# Patient Record
Sex: FEMALE | Race: BLACK OR AFRICAN AMERICAN | Employment: FULL TIME | URBAN - METROPOLITAN AREA
[De-identification: names, ages, dates, MRNs, and addresses within clinical notes are randomized per-mention and may not be internally consistent; named-entity substitution may affect disease eponyms.]

---

## 2019-10-30 ENCOUNTER — OFFICE VISIT (OUTPATIENT)
Dept: NEUROLOGY | Age: 59
End: 2019-10-30

## 2019-10-30 VITALS
DIASTOLIC BLOOD PRESSURE: 81 MMHG | RESPIRATION RATE: 16 BRPM | WEIGHT: 116.8 LBS | HEART RATE: 60 BPM | HEIGHT: 61 IN | OXYGEN SATURATION: 98 % | SYSTOLIC BLOOD PRESSURE: 138 MMHG | BODY MASS INDEX: 22.05 KG/M2 | TEMPERATURE: 98.6 F

## 2019-10-30 DIAGNOSIS — G43.019 INTRACTABLE MIGRAINE WITHOUT AURA AND WITHOUT STATUS MIGRAINOSUS: ICD-10-CM

## 2019-10-30 DIAGNOSIS — G56.03 BILATERAL CARPAL TUNNEL SYNDROME: ICD-10-CM

## 2019-10-30 DIAGNOSIS — G44.211 INTRACTABLE EPISODIC TENSION-TYPE HEADACHE: ICD-10-CM

## 2019-10-30 DIAGNOSIS — R20.2 PARESTHESIA: ICD-10-CM

## 2019-10-30 DIAGNOSIS — R56.9 SEIZURE (HCC): Primary | ICD-10-CM

## 2019-10-30 DIAGNOSIS — R20.9 CVA, OLD, ALTERATIONS OF SENSATIONS: ICD-10-CM

## 2019-10-30 DIAGNOSIS — I69.398 CVA, OLD, ALTERATIONS OF SENSATIONS: ICD-10-CM

## 2019-10-30 RX ORDER — GUAIFENESIN 100 MG/5ML
81 LIQUID (ML) ORAL DAILY
COMMUNITY

## 2019-10-30 RX ORDER — AMITRIPTYLINE HYDROCHLORIDE 10 MG/1
10 TABLET, FILM COATED ORAL
Qty: 30 TAB | Refills: 1 | Status: SHIPPED | OUTPATIENT
Start: 2019-10-30

## 2019-10-30 RX ORDER — IBUPROFEN 800 MG/1
800 TABLET ORAL
Qty: 30 TAB | Refills: 0 | Status: SHIPPED | OUTPATIENT
Start: 2019-10-30

## 2019-10-30 RX ORDER — LAMOTRIGINE 25 MG/1
25 TABLET ORAL
Qty: 30 TAB | Refills: 1 | Status: SHIPPED | OUTPATIENT
Start: 2019-10-30 | End: 2019-12-11 | Stop reason: SDUPTHER

## 2019-10-30 NOTE — PATIENT INSTRUCTIONS
All test results will be discussed at the next appointment. MRI of the Head: About This Test  What is it? MRI (magnetic resonance imaging) is a test that uses a magnetic field and pulses of radio wave energy to make pictures of the organs and structures inside the body. An MRI of the head can give your doctor information about your brain, eyes, ears, and nerves. When you have an MRI, you lie on a table and the table moves into the MRI machine. Why is this test done? An MRI of the head can help find problems such as tumors and infection. It also can check symptoms of a head injury or of diseases such as multiple sclerosis (MS) and stroke. How can you prepare for the test?  Talk to your doctor about all your health conditions before the test. For example, tell your doctor if:  · You are allergic to any medicines. · You are or might be pregnant. · You have a pacemaker, an artificial limb, any metal pins or metal parts in your body, metal heart valves, metal clips in your brain, metal implants in your ears, or any other implanted or prosthetic medical device. · You have an intrauterine device (IUD) in place. · You get nervous in confined spaces. You may need medicine to help you relax. · You wear a patch that contains medicine. · You have kidney disease. What happens before the test?  · You will remove all metal objects, such as hearing aids, dentures, jewelry, watches, and hairpins. · You may need to take off some of your clothes. You will be given a gown to wear during the test. If you do leave some clothes on, make sure you take everything out of your pockets. · You may have contrast material (dye) put into your arm through a tube called an IV. Contrast material helps doctors see specific organs, blood vessels, and most tumors. What happens during the test?  · You will lie on your back on a table that is part of the MRI scanner.  Your head, chest, and arms may be held with straps to help you lie still.  · The table will slide into the space that contains the magnet. A device called a coil may be placed over or wrapped around your head. · Inside the scanner you will hear a fan and feel air moving. You may hear tapping, thumping, or snapping noises. You may be given earplugs or headphones to reduce the noise. · You will be asked to hold still during the scan. You may be asked to hold your breath for short periods. · You may be alone in the scanning room, but a technologist will be watching you through a window and talking with you during the test.  What else should you know about the test?  · An MRI does not hurt. · You may feel warmth in the area being examined. This is normal.  · A dye (contrast material) that contains gadolinium may be used in this test. Be sure to tell your doctor if:  ? You are pregnant or think you may be pregnant. ? You have kidney problems. ? You've had more than one test that used gadolinium. · The U.S. Food and Drug Administration (FDA) has safety warnings about gadolinium. But for most people, the benefit of its use in this test outweighs the risk. · If a dye is used, you may feel a quick sting or pinch and some coolness when the IV is started. The dye may give you a metallic taste in your mouth. Some people feel sick to their stomach or get a headache. · If you breastfeed and are concerned about whether the dye used in this test is safe, talk to your doctor. Most experts believe that very little dye passes into breast milk and even less is passed on to the baby. But if you prefer, you can store some of your breast milk ahead of time and use it for a day or two after the test.  How long does the test take? · The test usually takes 30 to 60 minutes but can take as long as 2 hours. What happens after the test?  · You will probably be able to go home right away, depending on the reason for the test.  · You can go back to your usual activities right away.   Follow-up care is a key part of your treatment and safety. Be sure to make and go to all appointments, and call your doctor if you are having problems. It's also a good idea to keep a list of the medicines you take. Ask your doctor when you can expect to have your test results. Where can you learn more? Go to http://irvin-ashlie.info/. Enter X424 in the search box to learn more about \"MRI of the Head: About This Test.\"  Current as of: March 28, 2019  Content Version: 12.2  © 7867-6529 [employer]. Care instructions adapted under license by [employer] (which disclaims liability or warranty for this information). If you have questions about a medical condition or this instruction, always ask your healthcare professional. [address] any warranty or liability for your use of this information. Electroencephalogram (EEG): About This Test  What is it? An electroencephalogram (EEG) lets a doctor see the electrical activity of your brain. You will have small pads or patches attached to different places on your head. These are called electrodes. Wires connect the electrodes to a computer. The computer records the activity of the brain. This looks like wavy lines on the computer screen or on paper. Why is this test done? The test is often used to diagnose epilepsy. It helps a doctor know what types of seizures are happening. An EEG can also check brain activity in people with sleep disorders. It can also help a doctor know why a person passed out (lost consciousness). How can you prepare for the test?  · Tell your doctor if you are taking any medicines. Your doctor may ask you to stop taking certain medicines before the test. These include sedatives and tranquilizers, muscle relaxants, sleeping aids, and seizure medicines.   · Do not eat or drink anything with caffeine in it for 12 hours before the test. This includes cola, energy drinks, and chocolate. · Shampoo your hair and rinse with clear water the evening before or the morning of the test. Do not put any hair conditioner or oil on after you wash your hair. · Your doctor may ask you not to sleep the night before the test or to sleep for only about 4 or 5 hours. This is because some types of brain activity can only be seen if you are asleep. If your doctor asks you to get less sleep than normal, plan to have someone drive you to and from the test.  What happens during the test?  · You will lie on your back on a bed or table. Or you might relax in a chair with your eyes closed. · A technologist will attach the electrodes to different places on your head. Or you might get a cap with fixed electrodes on it. · You will lie still with your eyes closed. The technologist will tell you not to talk unless you need to. · The technologist may ask you to:  ? Breathe deeply and rapidly. This is called hyperventilating. ? Look at a bright, flashing light called a strobe. ? Go to sleep. If you can't fall asleep, you may get medicine to help you. What else should you know about the test?  · There is no pain. No electrical current goes through your body. · If you have a seizure disorder such as epilepsy, the flashing lights or hyperventilation may cause a seizure. The technologist is trained to take care of you if this happens. · If electrodes are put in your nose, they may tickle. In rare cases, they can also cause some soreness or a small amount of bleeding for 1 to 2 days after the test.  How long does the test take? · The test will take about 1 to 2 hours. What happens after the test?  · You will probably be able to go home right away. But if you didn't sleep your normal amount before the test, have someone drive you home. · You can go back to your usual activities right away. When should you call for help?   Watch closely for changes in your health, and be sure to contact your doctor if:  · You have any problems that you think may be from the test.  · You have any questions about the test or have not received your results. Follow-up care is a key part of your treatment and safety. Be sure to make and go to all appointments, and call your doctor if you are having problems. It's also a good idea to keep a list of the medicines you take. Ask your doctor when you can expect to have your test results. Where can you learn more? Go to http://irvin-ashlie.info/. Enter F196 in the search box to learn more about \"Electroencephalogram (EEG): About This Test.\"  Current as of: March 28, 2019  Content Version: 12.2  © 7834-3591 [employer]. Care instructions adapted under license by [employer] (which disclaims liability or warranty for this information). If you have questions about a medical condition or this instruction, always ask your healthcare professional. [place] [address] any warranty or liability for your use of this information. Electromyogram (EMG) and Nerve Conduction Studies: About These Tests  What are they? An electromyogram (EMG) measures the electrical activity of your muscles when you are not using them (at rest) and when you tighten them (muscle contraction). Nerve conduction studies (NCS) measure how well and how fast the nerves can send electrical signals. EMG and nerve conduction studies are often done together. If they are done together, the nerve conduction studies are done before the EMG. Why are they done? You may need an EMG to find diseases that damage your muscles or nerves or to find why you cannot move your muscles (paralysis), why they feel weak, or why they twitch. You may need nerve conduction studies to find damage to the nerves that lead from the brain and spinal cord to the rest of the body (peripheral nervous system).  Nerve conduction studies are often used to help find nerve disorders, such as carpal tunnel syndrome. How can you prepare for these tests? · Tell your doctors ALL the medicines, vitamins, supplements, and herbal remedies you take. Some medicines can affect the test results. You may need to stop taking some medicines before you have this test.     · If you take aspirin or some other blood thinner, be sure to talk to your doctor. He or she will tell you if you should stop taking it before your test. Make sure that you understand exactly what your doctor wants you to do.     · Wear loose-fitting clothing. You may be given a hospital gown to wear.     · The electrodes for the test are attached to your skin. Your skin needs to be clean and free of sprays, oils, creams, and lotions. What happens during the tests? You lie on a table or bed or sit in a reclining chair so your muscles are relaxed. For an EMG:  · Your doctor will insert a needle electrode into a muscle. This will record the electrical activity while the muscle is at rest. You may feel a quick, sharp pain when the needle electrode is put into a muscle. · Your doctor will ask you to tighten the same muscle slowly and steadily while the electrical activity is recorded. · Your doctor may move the electrode to a different area of the muscle or a different muscle. For nerve conduction studies:  · Your doctor will attach two types of electrodes to your skin. ? One type of electrode is placed over a nerve and will give the nerve an electrical pulse. ? The other type of electrode is placed over the muscle that the nerve controls. It will record how long it takes the muscle to react to the electrical pulse. · You will be able to feel the electrical pulses. They are small shocks and are safe. What else should you know about these tests? · After an EMG, you may be sore and have a tingling feeling in your muscles for up to 2 days. You may have small bruises or swelling at the needle site. · For an EMG, you may be asked to sign a consent form. Talk to your doctor about any concerns you have about the need for the test, its risks, how it will be done, or what the results will mean. How long do they take? · An EMG may take 30 to 60 minutes. · Nerve conduction tests may take from 15 minutes to 1 hour or more. It depends on how many nerves and muscles your doctor tests. What happens after these tests? · If any of the test areas are sore:  ? Put ice or a cold pack on the area for 10 to 20 minutes at a time. Put a thin cloth between the ice and your skin. ? Take an over-the-counter pain medicine, such as acetaminophen (Tylenol), ibuprofen (Advil, Motrin), or naproxen (Aleve). Be safe with medicines. Read and follow all instructions on the label. · You will probably be able to go home right away. · You can go back to your usual activities right away. When should you call for help? Watch closely for changes in your health, and be sure to contact your doctor if:  · Muscle pain from an EMG test gets worse or you have swelling, tenderness, or pus at any of the needle sites. · You have any problems that you think may be from the test.  · You have any questions about the test or have not received your results. Follow-up care is a key part of your treatment and safety. Be sure to make and go to all appointments, and call your doctor if you are having problems. It's also a good idea to keep a list of the medicines you take. Ask your doctor when you can expect to have your test results. Where can you learn more? Go to http://irvin-ashlie.info/. Enter N690 in the search box to learn more about \"Electromyogram (EMG) and Nerve Conduction Studies: About These Tests. \"  Current as of: March 28, 2019  Content Version: 12.2  © 5526-5692 [employer]. Care instructions adapted under license by [employer] (which disclaims liability or warranty for this information).  If you have questions about a medical condition or this instruction, always ask your healthcare professional. [place] [address] any warranty or liability for your use of this information.

## 2019-11-05 LAB
CRP SERPL HS-MCNC: 0.54 MG/L (ref 0–3)
HCYS SERPL-SCNC: 9.9 UMOL/L (ref 0–15)

## 2019-11-06 LAB
25(OH)D3+25(OH)D2 SERPL-MCNC: 24.7 NG/ML (ref 30–100)
ACE SERPL-CCNC: 55 U/L (ref 14–82)
ALDOLASE SERPL-CCNC: 4.1 U/L (ref 3.3–10.3)
ANA SER QL: NEGATIVE
CK SERPL-CCNC: 190 U/L (ref 24–173)
ERYTHROCYTE [SEDIMENTATION RATE] IN BLOOD BY WESTERGREN METHOD: 2 MM/HR (ref 0–40)
PROLACTIN SERPL-MCNC: 6 NG/ML (ref 4.8–23.3)
RHEUMATOID FACT SERPL-ACNC: <10 IU/ML (ref 0–13.9)
VIT B12 SERPL-MCNC: 465 PG/ML (ref 232–1245)

## 2019-11-20 ENCOUNTER — HOSPITAL ENCOUNTER (OUTPATIENT)
Dept: MRI IMAGING | Age: 59
Discharge: HOME | End: 2019-11-20
Attending: PSYCHIATRY & NEUROLOGY
Payer: COMMERCIAL

## 2019-11-20 ENCOUNTER — HOSPITAL ENCOUNTER (OUTPATIENT)
Dept: NEUROLOGY | Age: 59
Discharge: HOME | End: 2019-11-20
Attending: PSYCHIATRY & NEUROLOGY
Payer: COMMERCIAL

## 2019-11-20 VITALS — BODY MASS INDEX: 22.48 KG/M2 | WEIGHT: 119 LBS

## 2019-11-20 DIAGNOSIS — G43.019 INTRACTABLE MIGRAINE WITHOUT AURA AND WITHOUT STATUS MIGRAINOSUS: ICD-10-CM

## 2019-11-20 DIAGNOSIS — R20.2 PARESTHESIA: ICD-10-CM

## 2019-11-20 DIAGNOSIS — R56.9 SEIZURE (HCC): ICD-10-CM

## 2019-11-20 DIAGNOSIS — I69.398 CVA, OLD, ALTERATIONS OF SENSATIONS: ICD-10-CM

## 2019-11-20 DIAGNOSIS — R20.9 CVA, OLD, ALTERATIONS OF SENSATIONS: ICD-10-CM

## 2019-11-20 PROCEDURE — A9575 INJ GADOTERATE MEGLUMI 0.1ML: HCPCS | Performed by: PSYCHIATRY & NEUROLOGY

## 2019-11-20 PROCEDURE — 74011250636 HC RX REV CODE- 250/636: Performed by: PSYCHIATRY & NEUROLOGY

## 2019-11-20 PROCEDURE — 70553 MRI BRAIN STEM W/O & W/DYE: CPT

## 2019-11-20 PROCEDURE — 95816 EEG AWAKE AND DROWSY: CPT

## 2019-11-20 RX ORDER — GADOTERATE MEGLUMINE 376.9 MG/ML
10 INJECTION INTRAVENOUS
Status: COMPLETED | OUTPATIENT
Start: 2019-11-20 | End: 2019-11-20

## 2019-11-20 RX ADMIN — GADOTERATE MEGLUMINE 10 ML: 376.9 INJECTION INTRAVENOUS at 13:13

## 2019-12-03 ENCOUNTER — OFFICE VISIT (OUTPATIENT)
Dept: NEUROLOGY | Age: 59
End: 2019-12-03

## 2019-12-03 DIAGNOSIS — G56.03 BILATERAL CARPAL TUNNEL SYNDROME: Primary | ICD-10-CM

## 2019-12-03 DIAGNOSIS — G62.9 NEUROPATHY: ICD-10-CM

## 2019-12-03 DIAGNOSIS — M54.12 CERVICAL RADICULOPATHY: ICD-10-CM

## 2019-12-03 NOTE — PROGRESS NOTES
[address], [address], [address]  p: [phone]  f: [phone]    Test Date:  12/3/2019    Patient: Gayle Huang : 1960 Physician: Krystian Ortiz   Sex: Female Height: 5' 1\" Ref Phys:    ID#: [id number] Weight:  Female lbs. Technician: Collin Sanchez     Patient Complaints:  Numbness and tingling sensation of the hands, weakness of the hand, neck pain    Medications: See chart      Patient History / Exam: This is a 59-year-old right-handed female who is being evaluated for numbness and tingling sensation of the upper extremity, hand weakness and neck pain. NCV & EMG Findings:  Evaluation of the left median motor nerve showed reduced amplitude (4.1 mV). The right median sensory nerve showed prolonged distal peak latency (4.1 ms) and decreased conduction velocity (Wrist-2nd Digit, 34 m/s). The left median/ulnar (palm) comparison and the right median/ulnar (palm) comparison nerves showed abnormal peak latency difference (Median Palm-Ulnar Palm, L0.5, R0.8 ms). All remaining nerves (as indicated in the following tables) were within normal limits. Left vs. Right side comparison data for the ulnar motor nerve indicates abnormal L-R amplitude difference (42.0 %). The median sensory nerve indicates abnormal L-R latency difference (0.6 ms). All remaining left vs. right side differences were within normal limits. All examined muscles (as indicated in the following table) showed no evidence of electrical instability. Impression: Abnormal study. There is electrodiagnostic evidence of sensorimotor and axonal neuropathy bilateral median nerve, this is consistent with bilateral carpal tunnel syndrome. Cervical radiculopathy cannot be excluded.       Recommendations: Carpal tunnel release suggested normal neuro imaging of the cervical spine suggested.      ___________________________          Nerve Conduction Studies  Anti Sensory Summary Table     Stim Site NR Peak (ms) Norm Peak (ms) P-T Amp (µV) Norm P-T Amp Site1 Site2 Delta-P (ms) Dist (cm) Juan Daniel (m/s) Norm Juan Daniel (m/s)   Left Median Anti Sensory (2nd Digit)  33.4°C   Wrist    3.5 <3.6 52.1 >10 Wrist 2nd Digit 3.5 14.0 40 >39   Right Median Anti Sensory (2nd Digit)  35.4°C   Wrist    4.1 <3.6 37.7 >10 Wrist 2nd Digit 4.1 14.0 34 >39   Left Ulnar Anti Sensory (5th Digit)  34.1°C   Wrist    3.4 <3.7 46.2 >15.0 Wrist 5th Digit 3.4 14.0 41 >38   Right Ulnar Anti Sensory (5th Digit)  35.5°C   Wrist    3.5 <3.7 79.0 >15.0 Wrist 5th Digit 3.5 14.0 40 >38     Motor Summary Table     Stim Site NR Onset (ms) Norm Onset (ms) O-P Amp (mV) Norm O-P Amp Site1 Site2 Delta-0 (ms) Dist (cm) Juan Daniel (m/s) Norm Juan Daniel (m/s)   Left Median Motor (Abd Poll Brev)  35°C   Wrist    4.1 <4.2 4.1 >5 Elbow Wrist 3.8 23.0 61 >50   Elbow    7.9  4.0          Right Median Motor (Abd Poll Brev)  35.6°C   Wrist    3.8 <4.2 8.4 >5 Elbow Wrist 3.6 24.0 67 >50   Elbow    7.4  7.8          Left Ulnar Motor (Abd Dig Min)  35.2°C   Wrist    2.9 <4.2 5.8 >3 B Elbow Wrist 3.0 20.0 67 >53   B Elbow    5.9  5.6  A Elbow B Elbow 1.7 10.0 59 >53   A Elbow    7.6  5.0          Right Ulnar Motor (Abd Dig Min)  35.8°C   Wrist    2.7 <4.2 10.0 >3 B Elbow Wrist 3.2 23.0 72 >53   B Elbow    5.9  10.0  A Elbow B Elbow 1.7 10.0 59 >53   A Elbow    7.6  9.8            Comparison Summary Table     Stim Site NR Peak (ms) Norm Peak (ms) P-T Amp (µV) Site1 Site2 Delta-P (ms) Norm Delta (ms)   Left Median/Ulnar Palm Comparison (Wrist - 8cm)  34.7°C   Median Palm    2.0 <2.5 27.1 Median Palm Ulnar Palm 0.5 <0.3   Ulnar Palm    1.5 <2.5 29.5       Right Median/Ulnar Palm Comparison (Wrist - 8cm)  35.6°C   Median Palm    2.4 <2.5 26.4 Median Palm Ulnar Palm 0.8 <0.3   Ulnar Palm    1.6 <2.5 45.3         EMG+     Side Muscle Nerve Root Ins Act Fibs Psw Amp Dur Poly Recrt Int Pat Comment   Right Abd Dig Min Ulnar C8-T1 Nml Nml Nml Nml Nml 0 Nml Nml    Right Deltoid Axillary C5-6 Nml Nml Nml Nml Nml 0 Nml Nml    Right Biceps Musculocut C5-6 Nml Nml Nml Nml Nml 0 Nml Nml    Right Triceps Radial C6-7-8 Nml Nml Nml Nml Nml 0 Nml Nml    Right BrachioRad Radial C5-6 Nml Nml Nml Nml Nml 0 Nml Nml    Right Abd Poll Brev Median C8-T1 Nml Nml Nml Nml Nml 0 Nml Nml        Nerve Conduction Studies  Anti Sensory Left/Right Comparison     Stim Site L Lat (ms) R Lat (ms) L-R Lat (ms) L Amp (µV) R Amp (µV) L-R Amp (%) Site1 Site2 L Juan Daniel (m/s) R Juan Daniel (m/s) L-R Juan Daniel (m/s)   Median Anti Sensory (2nd Digit)  33.4°C   Wrist 3.5 4.1 0.6 52.1 37.7 27.6 Wrist 2nd Digit 40 34 6   Ulnar Anti Sensory (5th Digit)  34.1°C   Wrist 3.4 3.5 0.1 46.2 79.0 41.5 Wrist 5th Digit 41 40 1     Motor Left/Right Comparison     Stim Site L Lat (ms) R Lat (ms) L-R Lat (ms) L Amp (mV) R Amp (mV) L-R Amp (%) Site1 Site2 L Juan Daniel (m/s) R Juan Daniel (m/s) L-R Juan Daniel (m/s)   Median Motor (Abd Poll Brev)  35°C   Wrist 4.1 3.8 0.3 4.1 8.4 51.2 Elbow Wrist 61 67 6   Elbow 7.9 7.4 0.5 4.0 7.8 48.7        Ulnar Motor (Abd Dig Min)  35.2°C   Wrist 2.9 2.7 0.2 5.8 10.0 42.0 B Elbow Wrist 67 72 5   B Elbow 5.9 5.9 0.0 5.6 10.0 44.0 A Elbow B Elbow 59 59 0   A Elbow 7.6 7.6 0.0 5.0 9.8 49.0          Comparison Left/Right Comparison     Stim Site L Lat (ms) R Lat (ms) L-R Lat (ms) L Amp (µV) R Amp (µV) L-R Amp (%)   Median/Ulnar Palm Comparison (Wrist - 8cm)  34.7°C   Median Palm 2.0 2.4 0.4 27.1 26.4 2.6   Ulnar Palm 1.5 1.6 0.1 29.5 45.3 34.9         Waveforms:

## 2019-12-03 NOTE — PROCEDURES
PROCEDURE: ROUTINE INPATIENT EEG  NAME:   Clara Bautista  ACCOUNT NUMBER : [de-identified]  MRN:   [id number]  DATE OF SERVICE: 11/20/19    HISTORY/INDICATION: Pt is a 63yo female with h/o CVA and headaches, with spell concerning for seizure on 10/1/19, spells started with feeling sick and dizzy, her eyes then rolled back and extremities stiffened, afterwards she vomited. EEG is performed to assess for evidence of underlying epilepsy. MEDICATIONS:   Current Outpatient Medications   Medication Sig Dispense Refill    aspirin 81 mg chewable tablet Take 81 mg by mouth daily.  lamoTRIgine (LAMICTAL) 25 mg tablet Take 1 Tab by mouth nightly. 30 Tab 1    amitriptyline (ELAVIL) 10 mg tablet Take 1 Tab by mouth nightly. 30 Tab 1    ibuprofen (MOTRIN) 800 mg tablet Take 1 Tab by mouth every eight (8) hours as needed for Pain. 30 Tab 0       CONDITIONS OF RECORDING: This is a routine 21-channel EEG recording performed in accordance with the international 10-20 system with one channel devoted to limited EKG. This study was done during states of wakefulness and sleep. Photic stimulation and hyperventilation were performed as activating procedures. DESCRIPTION:   Upon maximal arousal the posterior dominant rhythm has a frequency of 10.5Hz with an amplitude of 20uV. This activity is symmetric over the bilateral posterior derivations and attenuates with eye opening. Photic stimulation and hyperventilation do not significantly alter the tracing. Normal sleep architecture is seen with stage II sleep recognized by the presence of symmetric vertex waves and sleep spindles. There are no focal abnormalities, epileptiform discharges, or electrographic seizures seen. INTERPRETATION: Normal awake and asleep EEG    CLINICAL CORRELATION: A normal EEG does not definitively exclude a diagnosis of epilepsy if clinical suspicion is high consider sleep deprived EEG.      Reed Crawley MD

## 2019-12-11 RX ORDER — LAMOTRIGINE 25 MG/1
TABLET ORAL
Qty: 30 TAB | Refills: 1 | Status: SHIPPED | OUTPATIENT
Start: 2019-12-11 | End: 2020-02-25

## 2019-12-29 NOTE — PROGRESS NOTES
Neurology Consult Note      HISTORY PROVIDED BY: patient    Chief Complaint:   Chief Complaint   Patient presents with    Syncope    New Patient      Subjective:    Ragini Bailey is a 61 y.o. right handed female who presents in consultation for syncope. This is a 59-year-old right-handed female with history of cerebrovascular accident, headaches, who was referred to the clinic to evaluate for syncopal episode. According to patient, she was told she had stroke after that, patient says she has been experiencing periodic headaches. Headache is usually squeezing in nature mostly on the left side. Frequency is variable, sometimes occurs with blackout spell. There is associated vertigo, occasional blurry vision, nausea, no photophobia or phonophobia. Patient noted that about a year ago she had a blackout spell while driving and since then she has been having periodic a blackout spell. Last blackout spell was about 3 weeks prior to presentation. She says sometimes after the blackout spell she will experience headache. Patient says she is also having numbness and tingling sensation in the right hand sometimes the hands tend to be weak and she drops things. She says at times the tingling sensation wakes her up at night, and she has to shake her hands not to shake it off.   Review of Systems - General ROS: positive for  - fatigue and sleep disturbance  Psychological ROS: positive for - anxiety and sleep disturbances  Ophthalmic ROS: positive for - blurry vision, decreased vision and double vision  ENT ROS: positive for - headaches, tinnitus, vertigo and visual changes  Allergy and Immunology ROS: negative  Hematological and Lymphatic ROS: negative  Endocrine ROS: negative  Respiratory ROS: no cough, shortness of breath, or wheezing  Cardiovascular ROS: no chest pain or dyspnea on exertion  Gastrointestinal ROS: no abdominal pain, change in bowel habits, or black or bloody stools  Genito-Urinary ROS: no dysuria, trouble voiding, or hematuria  Musculoskeletal ROS: positive for - joint pain, joint stiffness, muscle pain and muscular weakness  Neurological ROS: positive for - dizziness, headaches, numbness/tingling, visual changes and weakness  Dermatological ROS: negative    Past Medical History:   Diagnosis Date    Cerebral artery occlusion with cerebral infarction ([facility] [address]     Fatigue     Frequent headaches     Muscle pain       History reviewed. No pertinent surgical history.    Social History     Socioeconomic History    Marital status:      Spouse name: Not on file    Number of children: Not on file    Years of education: Not on file    Highest education level: Not on file   Occupational History    Not on file   Social Needs    Financial resource strain: Not on file    Food insecurity:     Worry: Not on file     Inability: Not on file    Transportation needs:     Medical: Not on file     Non-medical: Not on file   Tobacco Use    Smoking status: Not on file   Substance and Sexual Activity    Alcohol use: Not on file    Drug use: Not on file    Sexual activity: Not on file   Lifestyle    Physical activity:     Days per week: Not on file     Minutes per session: Not on file    Stress: Not on file   Relationships    Social connections:     Talks on phone: Not on file     Gets together: Not on file     Attends Jainism service: Not on file     Active member of club or organization: Not on file     Attends meetings of clubs or organizations: Not on file     Relationship status: Not on file    Intimate partner violence:     Fear of current or ex partner: Not on file     Emotionally abused: Not on file     Physically abused: Not on file     Forced sexual activity: Not on file   Other Topics Concern    Not on file   Social History Narrative    Not on file     Family History   Problem Relation Age of Onset    Dementia Mother     Cancer Father          Objective:   ROS  As per HPI  Allergies   Allergen Reactions    Pcn [Penicillins] Other (comments)     unknown        Meds:  Outpatient Medications Prior to Visit   Medication Sig Dispense Refill    aspirin 81 mg chewable tablet Take 81 mg by mouth daily. No facility-administered medications prior to visit. Imaging:  MRI Results (most recent):  Results from [place] encounter on 11/20/19   MRI BRAIN W WO CONT    Narrative EXAM:  MRI BRAIN W WO CONT    INDICATION:    syncope, seizure    COMPARISON:  None. CONTRAST: 10 ml Dotarem. TECHNIQUE:    Multiplanar multisequence acquisition without and with contrast of the brain. FINDINGS:  The ventricles are normal in size and position. Small nonspecific T2/FLAIR white  matter hyperintensity in the right anterior frontal subcortical white matter  (series 5 image 21). Small chronic infarcts are noted in the right occipital  lobe and right thalamus. The mesial temporal lobes and hippocampi are normal.  There is no acute infarct, hemorrhage, extra-axial fluid collection, or mass  effect. Mildly low-lying cerebellar tonsils measuring 7 mm below the foramen  magnum, but maintaining a rounded appearance without significant crowding of the  foramen magnum. Expected arterial flow-voids are present. No evidence of  abnormal enhancement. The paranasal sinuses, mastoid air cells, and middle ears are clear. The orbital  contents are within normal limits. No significant osseous or scalp lesions are  identified. Impression IMPRESSION:     1. No acute intracranial abnormality. 2. Small chronic infarcts in the right occipital lobe and right thalamus. 3. Small nonspecific T2/FLAIR hyperintensity in the subcortical white matter of  the right anterior frontal lobe. 4. Mildly low-lying cerebellar tonsils, but maintaining a rounded appearance  without significant crowding of the foramen magnum. This is favored to be within  normal variation, less likely a very mild Chiari I malformation.         CT Results (most recent):  No results found for this or any previous visit. Reviewed records in [employer] tab today    Lab Review   Results for orders placed or performed in visit on 10/30/19   ALDOLASE   Result Value Ref Range    Aldolase 4.1 3.3 - 10.3 U/L   VITAMIN B12   Result Value Ref Range    Vitamin B12 465 232 - 1,245 pg/mL   RHEUMATOID FACTOR, QL   Result Value Ref Range    Rheumatoid factor <10.0 0.0 - 13.9 IU/mL   HERNAN, DIRECT, W/REFLEX   Result Value Ref Range    Antinuclear Antibodies Direct Negative Negative   ANGIOTENSIN CONVERTING ENZYME   Result Value Ref Range    Angiotensin Converting Enzyme (ACE) 55 14 - 82 U/L   SED RATE (ESR)   Result Value Ref Range    Sed rate (ESR) 2 0 - 40 mm/hr   PROLACTIN   Result Value Ref Range    Prolactin 6.0 4.8 - 23.3 ng/mL   CK   Result Value Ref Range    Creatine Kinase,Total 190 (H) 24 - 173 U/L   VITAMIN D, 25 HYDROXY   Result Value Ref Range    VITAMIN D, 25-HYDROXY 24.7 (L) 30.0 - 100.0 ng/mL   HOMOCYSTEINE, PLASMA   Result Value Ref Range    Homocysteine, plasma 9.9 0.0 - 15.0 umol/L   CRP, HIGH SENSITIVITY   Result Value Ref Range    C-Reactive Protein, Cardiac 0.54 0.00 - 3.00 mg/L        Exam:  Visit Vitals  /81 (BP 1 Location: Left arm, BP Patient Position: Sitting)   Pulse 60   Temp 98.6 °F (37 °C) (Temporal)   Resp 16   Ht 5' 1\" (1.549 m)   Wt 116 lb 12.8 oz (53 kg)   SpO2 98%   BMI 22.07 kg/m²     General:  Alert, cooperative, no distress. Head:  Normocephalic, without obvious abnormality, atraumatic. Respiratory:  Heart:   Non labored breathing  Regular rate and rhythm, no murmurs   Neck:   2+ carotids, no bruits   Extremities: Warm, no cyanosis or edema. Pulses: 2+ radial pulses. Neurologic:  MS: Alert and oriented x 4, speech intact. Language intact, able to name, repeat, and follow all commands. Attention and fund of knowledge appropriate. Recent and remote memory intact.   Cranial Nerves:  II: visual fields Full to confrontation   II: pupils Equal, round, reactive to light   II: optic disc No papilledema   III,VII: ptosis none   III,IV,VI: extraocular muscles  EOMI, no nystagmus or diplopia   V: facial light touch sensation  normal   VII: facial muscle function   symmetric   VIII: hearing intact   IX: soft palate elevation  normal   XI: trapezius strength  5/5   XI: sternocleidomastoid strength 5/5   XII: tongue  Midline     Motor: normal bulk and tone, no tremor              Strength: 5/5 throughout, no PD  Sensory: Decreased sensation to LT, PP, temperature and vibration bilateral upper extremity in glove distribution, Tinel sign is positive. Coordination: FTN and HTS intact, DAGO intact,Romberg negative  Gait: normal gait, able to heel, toe, and tandem walk  Reflexes: 2+ symmetric. Plantar: Mute           Assessment/Plan       ICD-10-CM ICD-9-CM    1. Seizure (HCC) R56.9 780.39 MRI BRAIN W WO CONT      VITAMIN B12      RHEUMATOID FACTOR, QL      HERNAN, DIRECT, W/REFLEX      ANGIOTENSIN CONVERTING ENZYME      SED RATE (ESR)      PROLACTIN      CK      VITAMIN D, 25 HYDROXY      EEG   2. Paresthesia R20.2 782.0 MRI BRAIN W WO CONT      ALDOLASE      VITAMIN B12      RHEUMATOID FACTOR, QL      HERNAN, DIRECT, W/REFLEX      ANGIOTENSIN CONVERTING ENZYME      SED RATE (ESR)      VITAMIN D, 25 HYDROXY      EMG NCV MOTOR WITH F/WAVE PER NERVE      HOMOCYSTEINE, PLASMA      CRP, HIGH SENSITIVITY   3. Bilateral carpal tunnel syndrome G56.03 354.0 VITAMIN D, 25 HYDROXY      EMG NCV MOTOR WITH F/WAVE PER NERVE      HOMOCYSTEINE, PLASMA      CRP, HIGH SENSITIVITY   4. Intractable migraine without aura and without status migrainosus G43.019 346.11 MRI BRAIN W WO CONT      ALDOLASE      VITAMIN B12      RHEUMATOID FACTOR, QL      HERNAN, DIRECT, W/REFLEX      ANGIOTENSIN CONVERTING ENZYME      SED RATE (ESR)      VITAMIN D, 25 HYDROXY   5. Intractable episodic tension-type headache G44.211 339.11    6.  CVA, old, alterations of sensations H92.247 438.6 MRI BRAIN W WO CONT    R20.9  VITAMIN D, 25 HYDROXY      HOMOCYSTEINE, PLASMA      CRP, HIGH SENSITIVITY   Plan:  Lamictal 25 mg p.o. nightly  Amitriptyline 10 mg p.o. nightly  Motrin 800 mg p.o. every 8 hours as needed for pain and headaches  MRI of the brain with and without gadolinium  EEG  Blood for autoimmune work-up, homocystine, prolactin, C-reactive protein, vitamin B12, vitamin D, aldolase, CK, ESR  EMG/nerve conduction study bilateral upper extremity    Follow-up and Dispositions    · Return in about 2 months (around 12/30/2019). Thank you very much for this consultation.      Signed:  Americo Rodriguez MD  10/30/2019

## 2019-12-31 ENCOUNTER — OFFICE VISIT (OUTPATIENT)
Dept: NEUROLOGY | Age: 59
End: 2019-12-31

## 2019-12-31 VITALS
BODY MASS INDEX: 22.09 KG/M2 | DIASTOLIC BLOOD PRESSURE: 76 MMHG | WEIGHT: 117 LBS | HEART RATE: 60 BPM | HEIGHT: 61 IN | RESPIRATION RATE: 16 BRPM | OXYGEN SATURATION: 98 % | SYSTOLIC BLOOD PRESSURE: 118 MMHG

## 2019-12-31 DIAGNOSIS — G56.03 BILATERAL CARPAL TUNNEL SYNDROME: ICD-10-CM

## 2019-12-31 DIAGNOSIS — M79.18 MYOFASCIAL MUSCLE PAIN: ICD-10-CM

## 2019-12-31 DIAGNOSIS — G40.209 PARTIAL SYMPTOMATIC EPILEPSY WITH COMPLEX PARTIAL SEIZURES, NOT INTRACTABLE, WITHOUT STATUS EPILEPTICUS (HCC): Primary | ICD-10-CM

## 2019-12-31 DIAGNOSIS — Z86.73 HISTORY OF CVA (CEREBROVASCULAR ACCIDENT): ICD-10-CM

## 2019-12-31 DIAGNOSIS — R20.2 PARESTHESIA: ICD-10-CM

## 2019-12-31 DIAGNOSIS — E55.9 VITAMIN D DEFICIENCY: ICD-10-CM

## 2019-12-31 RX ORDER — ERGOCALCIFEROL 1.25 MG/1
50000 CAPSULE ORAL
Qty: 4 CAP | Refills: 3 | Status: SHIPPED | OUTPATIENT
Start: 2019-12-31 | End: 2020-10-07

## 2019-12-31 NOTE — PROGRESS NOTES
Chief Complaint   Patient presents with    Seizure     patient states that she has not experienced any seizure recently    Other     tension in lower neck      Visit Vitals  /76 (BP 1 Location: Right arm, BP Patient Position: Sitting)   Pulse 60   Resp 16   Ht 5' 1\" (1.549 m)   Wt 117 lb (53.1 kg)   SpO2 98%   BMI 22.11 kg/m²     Order placed for Note to be written on patient behalf for her to be placed on work restrictions she is to work from [employer] until [employer], five days a week. per Verbal Order from Dr. Joeann Schlatter on 12/31/2019 due to these restriction are being placed due to patient medical diagnosis of seizures as they cause her sleep deprivaiton.

## 2019-12-31 NOTE — PROGRESS NOTES
Neurology Progress Note    NAME:  Kimberlyn Solis   :   1960   MRN:   [id number]     Date/Time:  2019  Subjective:      Kimberlyn Solis is a 61 y.o. female here today for follow up for syncopal episode, numbness and tingling sensation, test results. Patient today says she has not had any episodes of blackout spells since last visit. She says however, he experiences numbness and tingling sensation of the hands, pain at times, and tends to drop things. She says pain wakes her up at night and she has to get up to shake it off. Patient says she experiences periodic headache, headache is throbbing in nature, mostly frontal, associated with dizziness occasional double vision blurry vision, no photophobia or phonophobia. She says there is additional nausea, frequency is variable. EMG/nerve conduction study of the upper extremity reviewed with patient showed bilateral carpal tunnel syndrome. EEG was unremarkable  Blood work was significant for vitamin D deficiency. Brain MRI was done for small subcortical chronic infarcts and mild Arnold-Chiari malformation type I  I will continue patient on Lamictal 25 mg p.o. nightly, Motrin 800 mg p.o. PRN for severe pain and headache, Elavil 10 mg p.o. nightly. I will refer patient to hand surgeon for carpal tunnel release.   I will replace vitamin D with vitamin D2 50,000 units p.o. q. weekly  Review of Systems - General ROS: positive for  - fatigue and sleep disturbance  Psychological ROS: positive for - anxiety and sleep disturbances  Ophthalmic ROS: positive for - blurry vision, decreased vision and double vision  ENT ROS: positive for - headaches, tinnitus, vertigo and visual changes  Allergy and Immunology ROS: negative  Hematological and Lymphatic ROS: negative  Endocrine ROS: negative  Respiratory ROS: no cough, shortness of breath, or wheezing  Cardiovascular ROS: no chest pain or dyspnea on exertion  Gastrointestinal ROS: no abdominal pain, change in bowel habits, or black or bloody stools  Genito-Urinary ROS: no dysuria, trouble voiding, or hematuria  Musculoskeletal ROS: positive for - joint pain, joint stiffness, muscle pain and muscular weakness  Neurological ROS: positive for - dizziness, headaches, numbness/tingling, visual changes and weakness  Dermatological ROS: negative    Medications reviewed:  Current Outpatient Medications   Medication Sig Dispense Refill    ergocalciferol (ERGOCALCIFEROL) 50,000 unit capsule Take 1 Cap by mouth every seven (7) days. 4 Cap 3    lamoTRIgine (LAMICTAL) 25 mg tablet TAKE 1 TABLET BY MOUTH EVERY DAY AT NIGHT 30 Tab 1    aspirin 81 mg chewable tablet Take 81 mg by mouth daily.  ibuprofen (MOTRIN) 800 mg tablet Take 1 Tab by mouth every eight (8) hours as needed for Pain. 30 Tab 0    amitriptyline (ELAVIL) 10 mg tablet Take 1 Tab by mouth nightly. 30 Tab 1        Objective:   Vitals:  Vitals:    12/31/19 0957   BP: 118/76   Pulse: 60   Resp: 16   SpO2: 98%   Weight: 117 lb (53.1 kg)   Height: 5' 1\" (1.549 m)   PainSc:   0 - No pain       Lab Data Reviewed:  No results found for: WBC, HCT, HGB, PLT, HCTEXT, HGBEXT, PLTEXT, HCTEXT, HGBEXT, PLTEXT    No results found for: NA, K, CL, CO2, GLU, BUN, CREA, CA    No components found for: TROPQUANT    No results found for: HERNAN      No results found for: HBA1C, HGBE8, WAL3MUVB, HOC9PZCT, LGQ2UQHX     Lab Results   Component Value Date/Time    Vitamin B12 465 11/04/2019 02:23 PM       No results found for: HERNAN, ANARX, ANAIGG, XBANA    No results found for: CHOL, CHOLPOCT, CHOLX, CHLST, CHOLV, HDL, HDLPOC, HDLP, LDL, LDLCPOC, LDLC, DLDLP, VLDLC, VLDL, TGLX, TRIGL, TRIGP, TGLPOCT, CHHD, CHHDX      CT Results (recent):  No results found for this or any previous visit. MRI Results (recent):  Results from [place] encounter on 11/20/19   MRI BRAIN W WO CONT    Narrative EXAM:  MRI BRAIN W WO CONT    INDICATION:    syncope, seizure    COMPARISON:  None.     CONTRAST: 10 ml Dotarem. TECHNIQUE:    Multiplanar multisequence acquisition without and with contrast of the brain. FINDINGS:  The ventricles are normal in size and position. Small nonspecific T2/FLAIR white  matter hyperintensity in the right anterior frontal subcortical white matter  (series 5 image 21). Small chronic infarcts are noted in the right occipital  lobe and right thalamus. The mesial temporal lobes and hippocampi are normal.  There is no acute infarct, hemorrhage, extra-axial fluid collection, or mass  effect. Mildly low-lying cerebellar tonsils measuring 7 mm below the foramen  magnum, but maintaining a rounded appearance without significant crowding of the  foramen magnum. Expected arterial flow-voids are present. No evidence of  abnormal enhancement. The paranasal sinuses, mastoid air cells, and middle ears are clear. The orbital  contents are within normal limits. No significant osseous or scalp lesions are  identified. Impression IMPRESSION:     1. No acute intracranial abnormality. 2. Small chronic infarcts in the right occipital lobe and right thalamus. 3. Small nonspecific T2/FLAIR hyperintensity in the subcortical white matter of  the right anterior frontal lobe. 4. Mildly low-lying cerebellar tonsils, but maintaining a rounded appearance  without significant crowding of the foramen magnum. This is favored to be within  normal variation, less likely a very mild Chiari I malformation. IR Results (recent):  No results found for this or any previous visit. VAS/US Results (recent):  No results found for this or any previous visit. PHYSICAL EXAM:  General:    Alert, cooperative, no distress, appears stated age. Head:   Normocephalic, without obvious abnormality, atraumatic. Eyes:   Conjunctivae/corneas clear. PERRLA  Nose:  Nares normal. No drainage or sinus tenderness.   Throat:    Lips, mucosa, and tongue normal.  No Thrush  Neck:  Supple, symmetrical,  no adenopathy, thyroid: non tender    no carotid bruit and no JVD. Back:    Symmetric,  No CVA tenderness. Lungs:   Clear to auscultation bilaterally. No Wheezing or Rhonchi. No rales. Chest wall:  No tenderness or deformity. No Accessory muscle use. Heart:   Regular rate and rhythm,  no murmur, rub or gallop. Abdomen:   Soft, non-tender. Not distended. Bowel sounds normal. No masses  Extremities: Extremities normal, atraumatic, No cyanosis. No edema. No clubbing  Skin:     Texture, turgor normal. No rashes or lesions. Not Jaundiced  Lymph nodes: Cervical, supraclavicular normal.  Psych:  Good insight. Not depressed. Not anxious or agitated. NEUROLOGICAL EXAM:  Appearance: The patient is well developed, well nourished, provides a coherent history and is in no acute distress. Mental Status: Oriented to time, place and person. Mood and affect appropriate. Cranial Nerves:   Intact visual fields. Fundi are benign. AVTAR, EOM's full, no nystagmus, no ptosis. Facial sensation is normal. Corneal reflexes are intact. Facial movement is symmetric. Hearing is normal bilaterally. Palate is midline with normal sternocleidomastoid and trapezius muscles are normal. Tongue is midline. Motor:  5/5 strength in upper and lower proximal and distal muscles. Normal bulk and tone. No fasciculations. Reflexes:   Deep tendon reflexes 2+/4 and symmetrical.   Sensory:    Dysesthesia to touch, pinprick and vibration. Gait:  Normal gait. Tremor:   No tremor noted. Cerebellar:  No cerebellar signs present. Neurovascular:  Normal heart sounds and regular rhythm, peripheral pulses intact, and no carotid bruits. Assesment  1. Partial symptomatic epilepsy with complex partial seizures, not intractable, without status epilepticus (HCC)  Continue Lamictal    2. History of CVA (cerebrovascular accident)  Aspirin 335 mg p.o. daily    3. Paresthesia  Stable    4. Myofascial muscle pain  Physical therapy    5.  Vitamin D deficiency  Replace vitamin D    ___________________________________________________  PLAN:      ICD-10-CM ICD-9-CM    1. Partial symptomatic epilepsy with complex partial seizures, not intractable, without status epilepticus ([facility] [address] G40.209 345.40    2. History of CVA (cerebrovascular accident) Z86.73 V12.54    3. Paresthesia R20.2 782.0    4. Myofascial muscle pain M79.18 729.1    5. Vitamin D deficiency E55.9 268.9    6. Bilateral carpal tunnel syndrome G56.03 354.0      Follow-up and Dispositions    · Return in about 4 months (around 4/30/2020).          :    ___________________________________________________    Attending Physician: Micaela Saini MD

## 2019-12-31 NOTE — PATIENT INSTRUCTIONS
[facility][note 3] Neurology Clinic   Statement to Patients  April 1, 2014      In an effort to ensure the large volume of patient prescription refills is processed in the most efficient and expeditious manner, we are asking our patients to assist us by calling your Pharmacy for all prescription refills, this will include also your  Mail Order Pharmacy. The pharmacy will contact our office electronically to continue the refill process. Please do not wait until the last minute to call your pharmacy. We need at least 48 hours (2days) to fill prescriptions. We also encourage you to call your pharmacy before going to  your prescription to make sure it is ready. With regard to controlled substance prescription refill requests (narcotic refills) that need to be picked up at our office, we ask your cooperation by providing us with at least 72 hours (3days) notice that you will need a refill. We will not refill narcotic prescription refill requests after 4:00pm on any weekday, Monday through Thursday, or after 2:00pm on Fridays, or on the weekends. We encourage everyone to explore another way of getting your prescription refill request processed using [employer], our patient web portal through our electronic medical record system. [employer] is an efficient and effective way to communicate your medication request directly to the office and  downloadable as an mitzi on your smart phone . [employer] also features a review functionality that allows you to view your medication list as well as leave messages for your physician. Are you ready to get connected? If so please review the attatched instructions or speak to any of our staff to get you set up right away! Thank you so much for your cooperation. Should you have any questions please contact our Practice Administrator.     The Physicians and Staff,  [facility]

## 2019-12-31 NOTE — LETTER
12/31/2019 10:26 AM 
 
Ms. Kang Wilson [address] [facility] [address] To whom it may concern, Anthony Garza is a patient of Dr. Chencho Harris at the [facility]. Patient is placed on work restrictions at this time. This is due to complications, that is a direct result from her medical condition. Mrs. Ciera Johnston can work from 10:00am to 5:00pm, 5 days a week. If you have any questions please have the patient call our office at [phone]. Sincerely, Jose Bergeron MD

## 2020-02-25 RX ORDER — LAMOTRIGINE 25 MG/1
TABLET ORAL
Qty: 30 TAB | Refills: 1 | Status: SHIPPED | OUTPATIENT
Start: 2020-02-25 | End: 2020-05-08

## 2020-05-08 RX ORDER — LAMOTRIGINE 25 MG/1
TABLET ORAL
Qty: 30 TAB | Refills: 1 | Status: SHIPPED | OUTPATIENT
Start: 2020-05-08 | End: 2020-07-27 | Stop reason: SDUPTHER

## 2020-07-27 RX ORDER — LAMOTRIGINE 25 MG/1
25 TABLET ORAL EVERY EVENING
Qty: 30 TAB | Refills: 0 | Status: SHIPPED | OUTPATIENT
Start: 2020-07-27 | End: 2020-08-27 | Stop reason: SDUPTHER

## 2020-08-27 RX ORDER — LAMOTRIGINE 25 MG/1
25 TABLET ORAL EVERY EVENING
Qty: 30 TAB | Refills: 0 | Status: SHIPPED | OUTPATIENT
Start: 2020-08-27 | End: 2020-09-27

## 2020-09-27 RX ORDER — LAMOTRIGINE 25 MG/1
TABLET ORAL
Qty: 30 TAB | Refills: 0 | Status: SHIPPED | OUTPATIENT
Start: 2020-09-27 | End: 2020-10-29 | Stop reason: SDUPTHER

## 2020-10-07 RX ORDER — ERGOCALCIFEROL 1.25 MG/1
CAPSULE ORAL
Qty: 4 CAP | Refills: 3 | Status: SHIPPED | OUTPATIENT
Start: 2020-10-07 | End: 2020-10-19 | Stop reason: SDUPTHER

## 2020-10-19 ENCOUNTER — VIRTUAL VISIT (OUTPATIENT)
Dept: NEUROLOGY | Age: 60
End: 2020-10-19
Payer: COMMERCIAL

## 2020-10-19 DIAGNOSIS — G43.019 INTRACTABLE MIGRAINE WITHOUT AURA AND WITHOUT STATUS MIGRAINOSUS: ICD-10-CM

## 2020-10-19 DIAGNOSIS — Z86.73 HISTORY OF CVA (CEREBROVASCULAR ACCIDENT): ICD-10-CM

## 2020-10-19 DIAGNOSIS — G40.209 PARTIAL SYMPTOMATIC EPILEPSY WITH COMPLEX PARTIAL SEIZURES, NOT INTRACTABLE, WITHOUT STATUS EPILEPTICUS (HCC): Primary | ICD-10-CM

## 2020-10-19 DIAGNOSIS — E55.9 VITAMIN D DEFICIENCY: ICD-10-CM

## 2020-10-19 DIAGNOSIS — G62.9 NEUROPATHY: ICD-10-CM

## 2020-10-19 DIAGNOSIS — G56.03 BILATERAL CARPAL TUNNEL SYNDROME: ICD-10-CM

## 2020-10-19 PROCEDURE — 99214 OFFICE O/P EST MOD 30 MIN: CPT | Performed by: PSYCHIATRY & NEUROLOGY

## 2020-10-19 RX ORDER — ERGOCALCIFEROL 1.25 MG/1
CAPSULE ORAL
Qty: 4 CAP | Refills: 4 | Status: SHIPPED | OUTPATIENT
Start: 2020-10-19 | End: 2021-05-06 | Stop reason: SDUPTHER

## 2020-10-19 NOTE — PROGRESS NOTES
Neurology Progress Note  Lisha Vilchis was seen by synchronous (real-time) audio-video technology on 10/19/20. Consent:  She  is aware that this patient-initiated Telehealth encounter is a billable service, with coverage as determined by her insurance carrier. She is aware that she may receive a bill and has provided verbal consent to proceed: Yes    I was in the office while conducting this encounter. Pursuant to the emergency declaration under the [address] [place] [address] [facility] authority and the [employer] Act, this Virtual  Visit was conducted, with patient's consent, to reduce the patient's risk of exposure to COVID-19 and provide continuity of care for an established patient. Services were provided through a video synchronous discussion virtually to substitute for in-person clinic visit. NAME:  Lisha Vilchis   :   1960   MRN:   [id number]     Date/Time:  10/19/2020  Subjective:     Lisha Vilchis is a 61 y.o. female here today for follow up for syncopal episode, numbness and tingling sensation  Patient today says she has not had any episodes of blackout spells since last visit, patient says everything is going good. She says however, she experiences numbness and tingling sensation of the hands, pain at times, and tends to drop things. She says pain wakes her up at night and she has to get up to shake it off. She is yet to have carpal tunnel release  Patient says she experiences periodic headache, headache is throbbing in nature, mostly frontal, associated with dizziness occasional double vision blurry vision, no photophobia or phonophobia. She says there is additional nausea, frequency is variable. .  She says she experiences tightness and pain in the neck at times but  it is associated with stress  Review of Systems - General ROS: positive for  - fatigue and sleep disturbance  Psychological ROS: positive for - anxiety and sleep disturbances  Ophthalmic ROS: positive for - blurry vision, decreased vision and double vision  ENT ROS: positive for - headaches, tinnitus, vertigo and visual changes  Allergy and Immunology ROS: negative  Hematological and Lymphatic ROS: negative  Endocrine ROS: negative  Respiratory ROS: no cough, shortness of breath, or wheezing  Cardiovascular ROS: no chest pain or dyspnea on exertion  Gastrointestinal ROS: no abdominal pain, change in bowel habits, or black or bloody stools  Genito-Urinary ROS: no dysuria, trouble voiding, or hematuria  Musculoskeletal ROS: positive for - joint pain, joint stiffness, muscle pain and muscular weakness  Neurological ROS: positive for - dizziness, headaches, numbness/tingling, visual changes and weakness  Dermatological ROS: negative        Medications reviewed:       Objective:   Vitals: There were no vitals filed for this visit. Lab Data Reviewed:  No results found for: WBC, HCT, HGB, PLT, HCTEXT, HGBEXT, PLTEXT, HCTEXT, HGBEXT, PLTEXT    No results found for: NA, K, CL, CO2, GLU, BUN, CREA, CA    No components found for: TROPQUANT    No results found for: HERNAN      No results found for: HBA1C, HGBE8, HYJ9VMHF, KMR3NOWJ, XRU8PSNZ     Lab Results   Component Value Date/Time    Vitamin B12 465 11/04/2019 02:23 PM       No results found for: HERNAN, ANARX, ANAIGG, XBANA    No results found for: CHOL, CHOLPOCT, CHOLX, CHLST, CHOLV, HDL, HDLPOC, HDLP, LDL, LDLCPOC, LDLC, DLDLP, VLDLC, VLDL, TGLX, TRIGL, TRIGP, TGLPOCT, CHHD, CHHDX      CT Results (recent):  No results found for this or any previous visit. MRI Results (recent):  Results from [place] encounter on 11/20/19   MRI BRAIN W WO CONT    Narrative EXAM:  MRI BRAIN W WO CONT    INDICATION:    syncope, seizure    COMPARISON:  None. CONTRAST: 10 ml Dotarem. TECHNIQUE:    Multiplanar multisequence acquisition without and with contrast of the brain.     FINDINGS:  The ventricles are normal in size and position. Small nonspecific T2/FLAIR white  matter hyperintensity in the right anterior frontal subcortical white matter  (series 5 image 21). Small chronic infarcts are noted in the right occipital  lobe and right thalamus. The mesial temporal lobes and hippocampi are normal.  There is no acute infarct, hemorrhage, extra-axial fluid collection, or mass  effect. Mildly low-lying cerebellar tonsils measuring 7 mm below the foramen  magnum, but maintaining a rounded appearance without significant crowding of the  foramen magnum. Expected arterial flow-voids are present. No evidence of  abnormal enhancement. The paranasal sinuses, mastoid air cells, and middle ears are clear. The orbital  contents are within normal limits. No significant osseous or scalp lesions are  identified. Impression IMPRESSION:     1. No acute intracranial abnormality. 2. Small chronic infarcts in the right occipital lobe and right thalamus. 3. Small nonspecific T2/FLAIR hyperintensity in the subcortical white matter of  the right anterior frontal lobe. 4. Mildly low-lying cerebellar tonsils, but maintaining a rounded appearance  without significant crowding of the foramen magnum. This is favored to be within  normal variation, less likely a very mild Chiari I malformation. IR Results (recent):  No results found for this or any previous visit. VAS/US Results (recent):  No results found for this or any previous visit. PHYSICAL EXAM:  General:    Alert, cooperative, no distress, appears stated age. Head:   Normocephalic, without obvious abnormality, atraumatic. Eyes:   Conjunctivae/corneas clear. Nose:  Nares normal. .  Throat:    Lips,and tongue normal.  No Thrush  Neck:  Symmetrical,  no adenopathy, thyroid. no JVD. Back:    Symmetric. Lungs:   Deferred. Chest wall:   No Accessory muscle use. Heart:   Deferred. Abdomen:    Not distended.     Extremities: Extremities normal, atraumatic, No cyanosis. No edema. No clubbing  Skin:      No rashes or lesions. Not Jaundiced  Lymph nodes: Cervical, supraclavicular normal.  Psych:  Good insight. Not depressed. Not anxious or agitated. NEUROLOGICAL EXAM:  Appearance: The patient is well developed, well nourished, provides a coherent history and is in no acute distress. Mental Status: Oriented to time, place and person. Mood and affect appropriate. Cranial Nerves:   Intact visual fields. EOM's full, no nystagmus, no ptosis. . Facial movement is symmetric. Hearing is normal bilaterally. Tongue is midline. Motor:   Moves all extremities. No fasciculations. Reflexes:   Deferred. Sensory:   Deferred. Gait:  Normal gait. Tremor:   No tremor noted. Cerebellar:  No cerebellar signs present. Assesment  1. Partial symptomatic epilepsy with complex partial seizures, not intractable, without status epilepticus (HCC)  Stable    2. History of CVA (cerebrovascular accident)  Stable    3. Vitamin D deficiency  Replace vitamin D    4. Bilateral carpal tunnel syndrome  Stable    5. Neuropathy  Stable    6. Intractable migraine without aura and without status migrainosus  Improved    ___________________________________________________  PLAN: Medication and plan discussed with patient      ICD-10-CM ICD-9-CM    1. Partial symptomatic epilepsy with complex partial seizures, not intractable, without status epilepticus ([facility] [address]  G40.209 345.40    2. History of CVA (cerebrovascular accident)  Z86.73 V12.54    3. Vitamin D deficiency  E55.9 268.9    4. Bilateral carpal tunnel syndrome  G56.03 354.0    5. Neuropathy  G62.9 355.9    6. Intractable migraine without aura and without status migrainosus  G43.019 346.11      Follow-up and Dispositions    · Return in about 4 months (around 2/19/2021).            ___________________________________________________    Attending Physician: Kiran Yost MD

## 2020-10-29 RX ORDER — LAMOTRIGINE 25 MG/1
25 TABLET ORAL EVERY EVENING
Qty: 90 TAB | Refills: 1 | Status: SHIPPED | OUTPATIENT
Start: 2020-10-29 | End: 2021-05-06 | Stop reason: SDUPTHER

## 2020-12-08 ENCOUNTER — TRANSCRIBE ORDER (OUTPATIENT)
Dept: SCHEDULING | Age: 60
End: 2020-12-08

## 2020-12-08 DIAGNOSIS — Z12.31 BREAST CANCER SCREENING BY MAMMOGRAM: ICD-10-CM

## 2020-12-08 DIAGNOSIS — Z13.820 SCREENING FOR OSTEOPOROSIS: Primary | ICD-10-CM

## 2021-02-22 ENCOUNTER — VIRTUAL VISIT (OUTPATIENT)
Dept: NEUROLOGY | Age: 61
End: 2021-02-22
Payer: COMMERCIAL

## 2021-02-22 DIAGNOSIS — E55.9 VITAMIN D DEFICIENCY: ICD-10-CM

## 2021-02-22 DIAGNOSIS — G62.9 NEUROPATHY: ICD-10-CM

## 2021-02-22 DIAGNOSIS — G43.009 MIGRAINE WITHOUT AURA AND WITHOUT STATUS MIGRAINOSUS, NOT INTRACTABLE: ICD-10-CM

## 2021-02-22 DIAGNOSIS — I69.398 CVA, OLD, ALTERATIONS OF SENSATIONS: ICD-10-CM

## 2021-02-22 DIAGNOSIS — G40.209 PARTIAL SYMPTOMATIC EPILEPSY WITH COMPLEX PARTIAL SEIZURES, NOT INTRACTABLE, WITHOUT STATUS EPILEPTICUS (HCC): Primary | ICD-10-CM

## 2021-02-22 DIAGNOSIS — R20.9 CVA, OLD, ALTERATIONS OF SENSATIONS: ICD-10-CM

## 2021-02-22 PROCEDURE — 99214 OFFICE O/P EST MOD 30 MIN: CPT | Performed by: PSYCHIATRY & NEUROLOGY

## 2021-05-06 ENCOUNTER — TELEPHONE (OUTPATIENT)
Dept: NEUROLOGY | Age: 61
End: 2021-05-06

## 2021-05-06 NOTE — TELEPHONE ENCOUNTER
Patient requesting refill of Lamotrigine and Vitamin D. Last refill: 10/19- lamotrigine and 10/29-vit d  Last office visit: 2/22/2021 VV    Please review and fill as warranted.

## 2021-05-07 RX ORDER — ERGOCALCIFEROL 1.25 MG/1
CAPSULE ORAL
Qty: 4 CAP | Refills: 4 | Status: SHIPPED | OUTPATIENT
Start: 2021-05-07 | End: 2022-02-16 | Stop reason: SDUPTHER

## 2021-05-07 RX ORDER — LAMOTRIGINE 25 MG/1
25 TABLET ORAL EVERY EVENING
Qty: 90 TAB | Refills: 1 | Status: SHIPPED | OUTPATIENT
Start: 2021-05-07 | End: 2021-11-02

## 2021-05-10 ENCOUNTER — TRANSCRIBE ORDER (OUTPATIENT)
Dept: SCHEDULING | Age: 61
End: 2021-05-10

## 2021-05-10 DIAGNOSIS — G44.311 INTRACTABLE ACUTE POST-TRAUMATIC HEADACHE: Primary | ICD-10-CM

## 2021-11-02 RX ORDER — LAMOTRIGINE 25 MG/1
TABLET ORAL
Qty: 90 TABLET | Refills: 1 | Status: SHIPPED | OUTPATIENT
Start: 2021-11-02 | End: 2022-05-17 | Stop reason: SDUPTHER

## 2022-02-19 RX ORDER — ERGOCALCIFEROL 1.25 MG/1
CAPSULE ORAL
Qty: 4 CAPSULE | Refills: 4 | Status: SHIPPED | OUTPATIENT
Start: 2022-02-19 | End: 2022-08-05

## 2022-04-21 NOTE — PROGRESS NOTES
Neurology Progress Note  Franklin Roberts was seen by synchronous (real-time) audio-video technology on 21. Consent:  She  is aware that this patient-initiated Telehealth encounter is a billable service, with coverage as determined by her insurance carrier. She is aware that she may receive a bill and has provided verbal consent to proceed: Yes    I was in the office while conducting this encounter. Pursuant to the emergency declaration under the [address] [place] [address] [facility] authority and the [employer] Act, this Virtual  Visit was conducted, with patient's consent, to reduce the patient's risk of exposure to COVID-19 and provide continuity of care for an established patient. Services were provided through a video synchronous discussion virtually to substitute for in-person clinic visit. NAME:  Franklin Roberts   :   1960   MRN:   [id number]     Date/Time:  2021  Subjective:   Franklin Roberts is a 60y. o. female here today for follow up for syncopal episode, numbness and tingling . Patient says she has been doing fairly well, no blackout or headache since the last visit  She says however, she is still experiences numbness and tingling sensation of the hands, pain at times, and tends to drop things. She says pain wakes her up at night and she has to get up to shake it off. It should  be recalled that patient EMG/nerve conduction study was significant for bilateral carpal tunnel syndrome, however, she says she is not ready for surgical procedure at this time  Patient says headache is very minimal, infrequent, headache is throbbing in nature, mostly frontal, associated with dizziness occasional double vision blurry vision, no photophobia or phonophobia.    Review of Systems - General ROS: positive for  - fatigue and sleep disturbance  Psychological ROS: positive for - anxiety and sleep disturbances  Ophthalmic ROS: positive for - blurry vision, decreased vision and double vision  ENT ROS: positive for - headaches, tinnitus, vertigo and visual changes  Allergy and Immunology ROS: negative  Hematological and Lymphatic ROS: negative  Endocrine ROS: negative  Respiratory ROS: no cough, shortness of breath, or wheezing  Cardiovascular ROS: no chest pain or dyspnea on exertion  Gastrointestinal ROS: no abdominal pain, change in bowel habits, or black or bloody stools  Genito-Urinary ROS: no dysuria, trouble voiding, or hematuria  Musculoskeletal ROS: positive for - joint pain, joint stiffness, muscle pain and muscular weakness  Neurological ROS: positive for - dizziness, headaches, numbness/tingling, visual changes and weakness  Dermatological ROS: negative          Medications reviewed:  Current Outpatient Medications   Medication Sig Dispense Refill    lamoTRIgine (LaMICtal) 25 mg tablet Take 1 Tab by mouth every evening. 90 Tab 1    ergocalciferol (ERGOCALCIFEROL) 1,250 mcg (50,000 unit) capsule TAKE 1 CAPSULE BY MOUTH ONE TIME PER WEEK 4 Cap 4    ibuprofen (MOTRIN) 800 mg tablet Take 1 Tab by mouth every eight (8) hours as needed for Pain. 30 Tab 0    aspirin 81 mg chewable tablet Take 81 mg by mouth daily.  amitriptyline (ELAVIL) 10 mg tablet Take 1 Tab by mouth nightly. 30 Tab 1        Objective:   Vitals: There were no vitals filed for this visit.             Lab Data Reviewed:  No results found for: WBC, HCT, HGB, PLT, HCTEXT, HGBEXT, PLTEXT    No results found for: NA, K, CL, CO2, GLU, BUN, CREA, CA    No components found for: TROPQUANT    No results found for: HERNAN      No results found for: HBA1C, HGBE8, MUL0LSCD, FOT8ANYR     Lab Results   Component Value Date/Time    Vitamin B12 465 11/04/2019 02:23 PM       No results found for: HERNAN, ANARX, ANAIGG, XBANA    No results found for: CHOL, CHOLPOCT, CHOLX, CHLST, CHOLV, HDL, HDLPOC, HDLP, LDL, LDLCPOC, LDLC, DLDLP, VLDLC, VLDL, TGLX, TRIGL, TRIGP, TGLPOCT, CHHD, CHHDX      CT Results (recent):  No results found for this or any previous visit. MRI Results (recent):  [facility] on 11[facility]   MRI BRAIN W WO CONT    Narrative EXAM:  MRI BRAIN W WO CONT    INDICATION:    syncope, seizure    COMPARISON:  None. CONTRAST: 10 ml Dotarem. TECHNIQUE:    Multiplanar multisequence acquisition without and with contrast of the brain. FINDINGS:  The ventricles are normal in size and position. Small nonspecific T2/FLAIR white  matter hyperintensity in the right anterior frontal subcortical white matter  (series 5 image 21). Small chronic infarcts are noted in the right occipital  lobe and right thalamus. The mesial temporal lobes and hippocampi are normal.  There is no acute infarct, hemorrhage, extra-axial fluid collection, or mass  effect. Mildly low-lying cerebellar tonsils measuring 7 mm below the foramen  magnum, but maintaining a rounded appearance without significant crowding of the  foramen magnum. Expected arterial flow-voids are present. No evidence of  abnormal enhancement. The paranasal sinuses, mastoid air cells, and middle ears are clear. The orbital  contents are within normal limits. No significant osseous or scalp lesions are  identified. Impression IMPRESSION:     1. No acute intracranial abnormality. 2. Small chronic infarcts in the right occipital lobe and right thalamus. 3. Small nonspecific T2/FLAIR hyperintensity in the subcortical white matter of  the right anterior frontal lobe. 4. Mildly low-lying cerebellar tonsils, but maintaining a rounded appearance  without significant crowding of the foramen magnum. This is favored to be within  normal variation, less likely a very mild Chiari I malformation. IR Results (recent):  No results found for this or any previous visit. VAS/US Results (recent):  No results found for this or any previous visit.     PHYSICAL EXAM:  General: Alert, cooperative, no distress, appears stated age. Head:   Normocephalic, without obvious abnormality, atraumatic. Eyes:   Conjunctivae/corneas clear. fairly well  Nose:  Tongue normal.  No Thrush  Neck:  Symmetrical,  no adenopathy, thyroid. no JVD. Back:    Symmetric. .  Lungs:   Clear to auscultation bilaterally. No Wheezing or Rhonchi. No rales. Chest wall:  No  deformity. No Accessory muscle use. Heart:   Deferred. .  Abdomen:    Not distended. Extremities: Extremities normal, atraumatic, No cyanosis. No edema. No clubbing  Skin:     No rashes or lesions. Not Jaundiced  Lymph nodes: Cervical, supraclavicular normal.  Psych:  Good insight. Not depressed. Not anxious or agitated. NEUROLOGICAL EXAM:  Appearance: The patient is well developed, well nourished, provides a coherent history and is in no acute distress. Mental Status: Oriented to time, place and person. Mood and affect appropriate. Cranial Nerves:   Intact visual fields. EOM's full, no nystagmus, no ptosis. Facial sensation is normal.. Facial movement is symmetric. Hearing is normal bilaterally. . Tongue is midline. Motor:   Moves all extremities, normal bulk . No fasciculations. Reflexes:   D moves all extremities eferred. Sensory:   Deferred. .   Gait:  Normal gait. Tremor:   No tremor noted. Cerebellar:  No cerebellar signs present. Assesment  1. Partial symptomatic epilepsy with complex partial seizures, not intractable, without status epilepticus (HCC)  Continue Lamictal  2. CVA, old, alterations of sensations  Aspirin 81 mg p.o. daily  3. Neuropathy  Carpal tunnel syndrome  Yet to schedulefor carpal tunnel release    4. Migraine without aura and without status migrainosus, not intractable  Stable  Arnold-Chiari malformation  Stable  5.  Vitamin D deficiency  Replace vitamin D    ___________________________________________________  PLAN: Medication and plan discussed with patient      ICD-10-CM ICD-9-CM    1. Partial symptomatic epilepsy with complex partial seizures, not intractable, without status [facility]pticus ([facility]  G40.209 345.40    2. CVA, old, alterations of sensations  I69.398 438.6     R20.9     3. Neuropathy  G62.9 355.9    4. Migraine without aura and without status migrainosus, not intractable  G43.009 346.10    5. Vitamin D deficiency  E55.9 268.9      Follow-up and Dispositions    · Return in about 1 year (around 2/22/2022).          ___________________________________________________    Attending Physician: Nancy Asif MD [facility] Regimen: Biotin 3 mg po QD, apply Vicks vapor rub to affected toe nails after applying ciclopirox. Then wear clean socks. Detail Level: Zone

## 2022-05-17 RX ORDER — LAMOTRIGINE 25 MG/1
25 TABLET ORAL EVERY EVENING
Qty: 90 TABLET | Refills: 0 | Status: SHIPPED | OUTPATIENT
Start: 2022-05-17 | End: 2022-08-24

## 2022-08-05 RX ORDER — ERGOCALCIFEROL 1.25 MG/1
CAPSULE ORAL
Qty: 4 CAPSULE | Refills: 4 | Status: SHIPPED | OUTPATIENT
Start: 2022-08-05

## 2022-08-24 RX ORDER — LAMOTRIGINE 25 MG/1
TABLET ORAL
Qty: 90 TABLET | Refills: 0 | Status: SHIPPED | OUTPATIENT
Start: 2022-08-24 | End: 2022-10-17

## 2022-10-17 RX ORDER — LAMOTRIGINE 25 MG/1
TABLET ORAL
Qty: 90 TABLET | Refills: 0 | Status: SHIPPED | OUTPATIENT
Start: 2022-10-17

## 2023-01-04 ENCOUNTER — OFFICE VISIT (OUTPATIENT)
Dept: NEUROLOGY | Age: 63
End: 2023-01-04
Payer: COMMERCIAL

## 2023-01-04 VITALS
RESPIRATION RATE: 18 BRPM | HEART RATE: 66 BPM | WEIGHT: 123.2 LBS | SYSTOLIC BLOOD PRESSURE: 110 MMHG | DIASTOLIC BLOOD PRESSURE: 68 MMHG | TEMPERATURE: 98 F | HEIGHT: 61 IN | OXYGEN SATURATION: 100 % | BODY MASS INDEX: 23.26 KG/M2

## 2023-01-04 DIAGNOSIS — Z86.73 HISTORY OF CVA (CEREBROVASCULAR ACCIDENT): ICD-10-CM

## 2023-01-04 DIAGNOSIS — G43.009 MIGRAINE WITHOUT AURA AND WITHOUT STATUS MIGRAINOSUS, NOT INTRACTABLE: ICD-10-CM

## 2023-01-04 DIAGNOSIS — G56.03 CARPAL TUNNEL SYNDROME, BILATERAL: ICD-10-CM

## 2023-01-04 DIAGNOSIS — R55 SYNCOPE AND COLLAPSE: Primary | ICD-10-CM

## 2023-01-04 PROCEDURE — 99214 OFFICE O/P EST MOD 30 MIN: CPT | Performed by: NURSE PRACTITIONER

## 2023-01-04 RX ORDER — LAMOTRIGINE 25 MG/1
25 TABLET ORAL EVERY EVENING
Qty: 90 TABLET | Refills: 1 | Status: SHIPPED | OUTPATIENT
Start: 2023-01-04

## 2023-01-04 RX ORDER — HYDROCORTISONE 25 MG/G
CREAM TOPICAL AS NEEDED
COMMUNITY
Start: 2022-12-29

## 2023-02-06 RX ORDER — ERGOCALCIFEROL 1.25 MG/1
CAPSULE ORAL
Qty: 4 CAPSULE | Refills: 4 | OUTPATIENT
Start: 2023-02-06

## 2023-02-06 NOTE — TELEPHONE ENCOUNTER
Requested Prescriptions     Pending Prescriptions Disp Refills    ergocalciferol (ERGOCALCIFEROL) 1,250 mcg (50,000 unit) capsule 4 Capsule 4     Sig: Once weekly     Last fill 8/5/22    Last visit 1/4/23    Next visit 8/7/23     Labs 11/4/2019 (24.7) B 12 Level

## 2023-03-17 RX ORDER — ERGOCALCIFEROL 1.25 MG/1
CAPSULE ORAL
Qty: 4 CAPSULE | Refills: 4 | OUTPATIENT
Start: 2023-03-17

## 2023-05-26 RX ORDER — ERGOCALCIFEROL 1.25 MG/1
CAPSULE ORAL
Qty: 4 CAPSULE | Refills: 3 | Status: SHIPPED | OUTPATIENT
Start: 2023-05-26

## 2023-08-07 ENCOUNTER — OFFICE VISIT (OUTPATIENT)
Age: 63
End: 2023-08-07
Payer: COMMERCIAL

## 2023-08-07 VITALS
WEIGHT: 124.3 LBS | DIASTOLIC BLOOD PRESSURE: 80 MMHG | HEIGHT: 61 IN | SYSTOLIC BLOOD PRESSURE: 124 MMHG | TEMPERATURE: 97.9 F | OXYGEN SATURATION: 100 % | RESPIRATION RATE: 18 BRPM | HEART RATE: 62 BPM | BODY MASS INDEX: 23.47 KG/M2

## 2023-08-07 DIAGNOSIS — R55 SYNCOPE AND COLLAPSE: ICD-10-CM

## 2023-08-07 DIAGNOSIS — E55.9 VITAMIN D DEFICIENCY: ICD-10-CM

## 2023-08-07 DIAGNOSIS — Z86.73 HISTORY OF CVA (CEREBROVASCULAR ACCIDENT): ICD-10-CM

## 2023-08-07 DIAGNOSIS — G47.00 INSOMNIA, UNSPECIFIED TYPE: Primary | ICD-10-CM

## 2023-08-07 DIAGNOSIS — G43.009 MIGRAINE WITHOUT AURA, NOT INTRACTABLE, WITHOUT STATUS MIGRAINOSUS: ICD-10-CM

## 2023-08-07 PROCEDURE — 99214 OFFICE O/P EST MOD 30 MIN: CPT | Performed by: NURSE PRACTITIONER

## 2023-08-07 ASSESSMENT — PATIENT HEALTH QUESTIONNAIRE - PHQ9
SUM OF ALL RESPONSES TO PHQ9 QUESTIONS 1 & 2: 0
SUM OF ALL RESPONSES TO PHQ QUESTIONS 1-9: 0
2. FEELING DOWN, DEPRESSED OR HOPELESS: 0
1. LITTLE INTEREST OR PLEASURE IN DOING THINGS: 0
SUM OF ALL RESPONSES TO PHQ QUESTIONS 1-9: 0

## 2023-08-18 ENCOUNTER — HOSPITAL ENCOUNTER (OUTPATIENT)
Facility: HOSPITAL | Age: 63
Discharge: HOME | End: 2023-08-18
Payer: COMMERCIAL

## 2023-08-18 DIAGNOSIS — R55 SYNCOPE AND COLLAPSE: ICD-10-CM

## 2023-08-18 PROCEDURE — 95816 EEG AWAKE AND DROWSY: CPT

## 2023-09-19 ENCOUNTER — TELEPHONE (OUTPATIENT)
Age: 63
End: 2023-09-19

## 2023-09-19 NOTE — TELEPHONE ENCOUNTER
Received fax request for Vitamin D refill. Called PCP for Vitamin D labs- she advised no recent Vitamin D lab since 2017.

## 2023-09-20 DIAGNOSIS — E55.9 VITAMIN D DEFICIENCY: Primary | ICD-10-CM

## 2023-09-20 NOTE — TELEPHONE ENCOUNTER
You  DEMARCO Faulkner NP 19 hours ago (4:00 PM)     DR  No Vitamin D lab with PCP  and last lab in Epic from 2019. Requesting refill on Vitamin D, do you want to order labs before Vitamin D refill? Please advise, thanks. Message  Received: Today  DEMARCO Faulkner NP, LPN  Caller: Unspecified Javed Parra,  3:56 PM)  Sure I will go ahead and write an order and she can take to her appt with her PCP. EN             Called pt,verified pt with two pt identifiers, advised pt that we had received refill on Vitamin D but we did not have any recent labs on her. I called her PCP office and they did not have recent labs on her either. She advised she is going to her PCP office in the next month and can get it done there. I advised Robe Torres did a lab slip for vitamin d check and I will place that in the mail to pt so she can take to her PCP appt. Pt verbalized understanding and would do that. Put lab slip in mail to pt.

## 2024-01-02 DIAGNOSIS — G43.009 MIGRAINE WITHOUT AURA, NOT INTRACTABLE, WITHOUT STATUS MIGRAINOSUS: ICD-10-CM

## 2024-01-04 RX ORDER — LAMOTRIGINE 25 MG/1
25 TABLET ORAL EVERY EVENING
Qty: 90 TABLET | Refills: 1 | Status: SHIPPED | OUTPATIENT
Start: 2024-01-04